# Patient Record
Sex: MALE | Race: WHITE | NOT HISPANIC OR LATINO | URBAN - METROPOLITAN AREA
[De-identification: names, ages, dates, MRNs, and addresses within clinical notes are randomized per-mention and may not be internally consistent; named-entity substitution may affect disease eponyms.]

---

## 2017-01-06 ENCOUNTER — OFFICE VISIT - HEALTHEAST (OUTPATIENT)
Dept: OTOLARYNGOLOGY | Facility: CLINIC | Age: 77
End: 2017-01-06

## 2017-01-06 DIAGNOSIS — H90.3 ASNHL (ASYMMETRICAL SENSORINEURAL HEARING LOSS): ICD-10-CM

## 2017-01-06 ASSESSMENT — MIFFLIN-ST. JEOR: SCORE: 1647.05

## 2017-11-01 ENCOUNTER — AMBULATORY - HEALTHEAST (OUTPATIENT)
Dept: NURSING | Facility: CLINIC | Age: 77
End: 2017-11-01

## 2017-11-01 DIAGNOSIS — Z23 NEED FOR VACCINATION: ICD-10-CM

## 2017-12-29 ENCOUNTER — COMMUNICATION - HEALTHEAST (OUTPATIENT)
Dept: FAMILY MEDICINE | Facility: CLINIC | Age: 77
End: 2017-12-29

## 2018-06-22 ENCOUNTER — COMMUNICATION - HEALTHEAST (OUTPATIENT)
Dept: FAMILY MEDICINE | Facility: CLINIC | Age: 78
End: 2018-06-22

## 2018-06-22 DIAGNOSIS — M54.9 BACK PAIN: ICD-10-CM

## 2018-07-09 ENCOUNTER — HOSPITAL ENCOUNTER (OUTPATIENT)
Dept: PHYSICAL MEDICINE AND REHAB | Facility: CLINIC | Age: 78
Discharge: HOME | End: 2018-07-09
Attending: PHYSICAL MEDICINE & REHABILITATION

## 2018-07-09 DIAGNOSIS — M54.50 LUMBAR SPINE PAIN: ICD-10-CM

## 2018-07-09 DIAGNOSIS — M41.9 SCOLIOSIS: ICD-10-CM

## 2018-07-09 DIAGNOSIS — R29.2 ABSENT LEFT PATELLAR REFLEX: ICD-10-CM

## 2018-07-09 ASSESSMENT — MIFFLIN-ST. JEOR: SCORE: 1701.48

## 2018-07-23 ENCOUNTER — OFFICE VISIT - HEALTHEAST (OUTPATIENT)
Dept: PHYSICAL THERAPY | Facility: REHABILITATION | Age: 78
End: 2018-07-23

## 2018-07-23 DIAGNOSIS — H90.3 ASNHL (ASYMMETRICAL SENSORINEURAL HEARING LOSS): ICD-10-CM

## 2018-07-23 DIAGNOSIS — M54.50 CHRONIC MIDLINE LOW BACK PAIN WITHOUT SCIATICA: ICD-10-CM

## 2018-07-23 DIAGNOSIS — R29.3 POOR POSTURE: ICD-10-CM

## 2018-07-23 DIAGNOSIS — G89.29 CHRONIC MIDLINE LOW BACK PAIN WITHOUT SCIATICA: ICD-10-CM

## 2018-07-23 DIAGNOSIS — M62.81 MUSCLE WEAKNESS (GENERALIZED): ICD-10-CM

## 2018-07-26 ENCOUNTER — OFFICE VISIT - HEALTHEAST (OUTPATIENT)
Dept: PHYSICAL THERAPY | Facility: REHABILITATION | Age: 78
End: 2018-07-26

## 2018-07-26 DIAGNOSIS — R29.3 POOR POSTURE: ICD-10-CM

## 2018-07-26 DIAGNOSIS — G89.29 CHRONIC MIDLINE LOW BACK PAIN WITHOUT SCIATICA: ICD-10-CM

## 2018-07-26 DIAGNOSIS — M62.81 MUSCLE WEAKNESS (GENERALIZED): ICD-10-CM

## 2018-07-26 DIAGNOSIS — M54.50 CHRONIC MIDLINE LOW BACK PAIN WITHOUT SCIATICA: ICD-10-CM

## 2018-07-31 ENCOUNTER — OFFICE VISIT - HEALTHEAST (OUTPATIENT)
Dept: PHYSICAL THERAPY | Facility: REHABILITATION | Age: 78
End: 2018-07-31

## 2018-07-31 DIAGNOSIS — R29.3 POOR POSTURE: ICD-10-CM

## 2018-07-31 DIAGNOSIS — G89.29 CHRONIC MIDLINE LOW BACK PAIN WITHOUT SCIATICA: ICD-10-CM

## 2018-07-31 DIAGNOSIS — M62.81 MUSCLE WEAKNESS (GENERALIZED): ICD-10-CM

## 2018-07-31 DIAGNOSIS — M54.50 CHRONIC MIDLINE LOW BACK PAIN WITHOUT SCIATICA: ICD-10-CM

## 2018-08-06 ENCOUNTER — OFFICE VISIT - HEALTHEAST (OUTPATIENT)
Dept: PHYSICAL THERAPY | Facility: REHABILITATION | Age: 78
End: 2018-08-06

## 2018-08-06 DIAGNOSIS — M62.81 MUSCLE WEAKNESS (GENERALIZED): ICD-10-CM

## 2018-08-06 DIAGNOSIS — R29.3 POOR POSTURE: ICD-10-CM

## 2018-08-06 DIAGNOSIS — M54.50 CHRONIC MIDLINE LOW BACK PAIN WITHOUT SCIATICA: ICD-10-CM

## 2018-08-06 DIAGNOSIS — G89.29 CHRONIC MIDLINE LOW BACK PAIN WITHOUT SCIATICA: ICD-10-CM

## 2018-08-20 ENCOUNTER — OFFICE VISIT - HEALTHEAST (OUTPATIENT)
Dept: PHYSICAL THERAPY | Facility: REHABILITATION | Age: 78
End: 2018-08-20

## 2018-08-20 DIAGNOSIS — M54.50 CHRONIC MIDLINE LOW BACK PAIN WITHOUT SCIATICA: ICD-10-CM

## 2018-08-20 DIAGNOSIS — R29.3 POOR POSTURE: ICD-10-CM

## 2018-08-20 DIAGNOSIS — M62.81 MUSCLE WEAKNESS (GENERALIZED): ICD-10-CM

## 2018-08-20 DIAGNOSIS — G89.29 CHRONIC MIDLINE LOW BACK PAIN WITHOUT SCIATICA: ICD-10-CM

## 2018-09-04 ENCOUNTER — OFFICE VISIT - HEALTHEAST (OUTPATIENT)
Dept: PHYSICAL THERAPY | Facility: REHABILITATION | Age: 78
End: 2018-09-04

## 2018-09-04 DIAGNOSIS — M62.81 MUSCLE WEAKNESS (GENERALIZED): ICD-10-CM

## 2018-09-04 DIAGNOSIS — G89.29 CHRONIC MIDLINE LOW BACK PAIN WITHOUT SCIATICA: ICD-10-CM

## 2018-09-04 DIAGNOSIS — R29.3 POOR POSTURE: ICD-10-CM

## 2018-09-04 DIAGNOSIS — M54.50 CHRONIC MIDLINE LOW BACK PAIN WITHOUT SCIATICA: ICD-10-CM

## 2019-04-25 ENCOUNTER — COMMUNICATION - HEALTHEAST (OUTPATIENT)
Dept: FAMILY MEDICINE | Facility: CLINIC | Age: 79
End: 2019-04-25

## 2019-05-10 ENCOUNTER — COMMUNICATION - HEALTHEAST (OUTPATIENT)
Dept: FAMILY MEDICINE | Facility: CLINIC | Age: 79
End: 2019-05-10

## 2019-05-10 ENCOUNTER — OFFICE VISIT - HEALTHEAST (OUTPATIENT)
Dept: FAMILY MEDICINE | Facility: CLINIC | Age: 79
End: 2019-05-10

## 2019-05-10 DIAGNOSIS — R01.1 HEART MURMUR: ICD-10-CM

## 2019-05-10 DIAGNOSIS — Z00.00 HEALTH CARE MAINTENANCE: ICD-10-CM

## 2019-05-10 LAB
ALBUMIN SERPL-MCNC: 4.1 G/DL (ref 3.5–5)
ALP SERPL-CCNC: 61 U/L (ref 45–120)
ALT SERPL W P-5'-P-CCNC: 14 U/L (ref 0–45)
ANION GAP SERPL CALCULATED.3IONS-SCNC: 8 MMOL/L (ref 5–18)
AST SERPL W P-5'-P-CCNC: 16 U/L (ref 0–40)
BILIRUB SERPL-MCNC: 0.7 MG/DL (ref 0–1)
BUN SERPL-MCNC: 15 MG/DL (ref 8–28)
CALCIUM SERPL-MCNC: 9.2 MG/DL (ref 8.5–10.5)
CHLORIDE BLD-SCNC: 105 MMOL/L (ref 98–107)
CHOLEST SERPL-MCNC: 179 MG/DL
CO2 SERPL-SCNC: 28 MMOL/L (ref 22–31)
CREAT SERPL-MCNC: 0.91 MG/DL (ref 0.7–1.3)
FASTING STATUS PATIENT QL REPORTED: YES
GFR SERPL CREATININE-BSD FRML MDRD: >60 ML/MIN/1.73M2
GLUCOSE BLD-MCNC: 96 MG/DL (ref 70–125)
HDLC SERPL-MCNC: 55 MG/DL
LDLC SERPL CALC-MCNC: 111 MG/DL
POTASSIUM BLD-SCNC: 4.1 MMOL/L (ref 3.5–5)
PROT SERPL-MCNC: 7.1 G/DL (ref 6–8)
SODIUM SERPL-SCNC: 141 MMOL/L (ref 136–145)
TRIGL SERPL-MCNC: 65 MG/DL

## 2019-05-10 ASSESSMENT — MIFFLIN-ST. JEOR: SCORE: 1660.44

## 2019-05-15 ENCOUNTER — COMMUNICATION - HEALTHEAST (OUTPATIENT)
Dept: FAMILY MEDICINE | Facility: CLINIC | Age: 79
End: 2019-05-15

## 2019-05-31 ENCOUNTER — HOSPITAL ENCOUNTER (OUTPATIENT)
Dept: CARDIOLOGY | Facility: HOSPITAL | Age: 79
Discharge: HOME | End: 2019-05-31
Attending: FAMILY MEDICINE

## 2019-05-31 DIAGNOSIS — R01.1 HEART MURMUR: ICD-10-CM

## 2019-05-31 ASSESSMENT — MIFFLIN-ST. JEOR: SCORE: 1659.53

## 2019-06-03 ENCOUNTER — COMMUNICATION - HEALTHEAST (OUTPATIENT)
Dept: FAMILY MEDICINE | Facility: CLINIC | Age: 79
End: 2019-06-03

## 2019-06-03 LAB
AORTIC ROOT: 3.7 CM
AORTIC VALVE MEAN VELOCITY: 103 CM/S
AV CUSP SEPERATION: 1.9 CM
AV CUSP SEPERATION: 1.9 CM
AV DIMENSIONLESS INDEX VTI: 0.6
AV MEAN GRADIENT: 4 MMHG
AV PEAK GRADIENT: 6.4 MMHG
AV VALVE AREA: 2.2 CM2
BSA FOR ECHO PROCEDURE: 2.15 M2
CV ECHO HEIGHT: 68.5 IN
CV ECHO WEIGHT: 211 LBS
DOP CALC AO PEAK VEL: 126 CM/S
DOP CALC AO VTI: 28.7 CM
DOP CALC LVOT AREA: 3.46 CM2
DOP CALC LVOT DIAMETER: 2.1 CM
DOP CALC LVOT STROKE VOLUME: 63 CM3
DOP CALC MV VTI: 43.5 CM
DOP CALCLVOT PEAK VEL VTI: 18.2 CM
EJECTION FRACTION: 58 % (ref 55–75)
FRACTIONAL SHORTENING: 29.7 % (ref 28–44)
INTERVENTRICULAR SEPTUM IN END DIASTOLE: 1.1 CM (ref 0.6–1)
IVS/PW RATIO: 1.1
LA AREA 1: 14.3 CM2
LA AREA 2: 16.7 CM2
LEFT ATRIUM LENGTH: 4.5 CM
LEFT ATRIUM SIZE: 3.7 CM
LEFT ATRIUM VOLUME INDEX: 21 ML/M2
LEFT ATRIUM VOLUME: 45.1 ML
LEFT VENTRICLE CARDIAC INDEX: 2.1 L/MIN/M2
LEFT VENTRICLE CARDIAC OUTPUT: 4.6 L/MIN
LEFT VENTRICLE DIASTOLIC VOLUME INDEX: 32.1 CM3/M2 (ref 34–74)
LEFT VENTRICLE DIASTOLIC VOLUME: 69 CM3 (ref 62–150)
LEFT VENTRICLE HEART RATE: 73 BPM
LEFT VENTRICLE MASS INDEX: 56.2 G/M2
LEFT VENTRICLE SYSTOLIC VOLUME INDEX: 13.5 CM3/M2 (ref 11–31)
LEFT VENTRICLE SYSTOLIC VOLUME: 29 CM3 (ref 21–61)
LEFT VENTRICULAR INTERNAL DIMENSION IN DIASTOLE: 3.7 CM (ref 4.2–5.8)
LEFT VENTRICULAR INTERNAL DIMENSION IN SYSTOLE: 2.6 CM (ref 2.5–4)
LEFT VENTRICULAR MASS: 120.8 G
LEFT VENTRICULAR OUTFLOW TRACT MEAN GRADIENT: 2 MMHG
LEFT VENTRICULAR OUTFLOW TRACT MEAN VELOCITY: 70.1 CM/S
LEFT VENTRICULAR POSTERIOR WALL IN END DIASTOLE: 1 CM (ref 0.6–1)
LV STROKE VOLUME INDEX: 29.3 ML/M2
MITRAL VALVE DECELERATION SLOPE: 2940 MM/S2
MITRAL VALVE E/A RATIO: 0.7
MITRAL VALVE MEAN INFLOW VELOCITY: 71.5 CM/S
MITRAL VALVE PEAK VELOCITY: 119 CM/S
MITRAL VALVE PRESSURE HALF-TIME: 111 MS
MV AREA VTI: 1.45 CM2
MV AVERAGE E/E' RATIO: 9.8 CM/S
MV DECELERATION TIME: 264 MS
MV E'TISSUE VEL-LAT: 7.6 CM/S
MV E'TISSUE VEL-MED: 8.19 CM/S
MV LATERAL E/E' RATIO: 10.2
MV MEAN GRADIENT: 2 MMHG
MV MEDIAL E/E' RATIO: 9.5
MV PEAK A VELOCITY: 109 CM/S
MV PEAK E VELOCITY: 77.5 CM/S
MV PEAK GRADIENT: 5.7 MMHG
MV VALVE AREA BY CONTINUITY EQUATION: 1.4 CM2
MV VALVE AREA PRESSURE 1/2 METHOD: 2 CM2
NUC REST DIASTOLIC VOLUME INDEX: 3376 LBS
NUC REST SYSTOLIC VOLUME INDEX: 68.5 IN
TRICUSPID REGURGITATION PEAK PRESSURE GRADIENT: 25.8 MMHG
TRICUSPID VALVE ANULAR PLANE SYSTOLIC EXCURSION: 2.4 CM
TRICUSPID VALVE PEAK REGURGITANT VELOCITY: 254 CM/S

## 2019-11-13 ENCOUNTER — AMBULATORY - HEALTHEAST (OUTPATIENT)
Dept: FAMILY MEDICINE | Facility: CLINIC | Age: 79
End: 2019-11-13

## 2019-11-13 DIAGNOSIS — Z00.00 HEALTH CARE MAINTENANCE: ICD-10-CM

## 2020-06-11 ENCOUNTER — OFFICE VISIT - HEALTHEAST (OUTPATIENT)
Dept: FAMILY MEDICINE | Facility: CLINIC | Age: 80
End: 2020-06-11

## 2020-06-11 DIAGNOSIS — Z71.1 CONCERN ABOUT EAR DISEASE WITHOUT DIAGNOSIS: ICD-10-CM

## 2020-10-27 ENCOUNTER — COMMUNICATION - HEALTHEAST (OUTPATIENT)
Dept: FAMILY MEDICINE | Facility: CLINIC | Age: 80
End: 2020-10-27

## 2020-12-03 ENCOUNTER — OFFICE VISIT - HEALTHEAST (OUTPATIENT)
Dept: FAMILY MEDICINE | Facility: CLINIC | Age: 80
End: 2020-12-03

## 2020-12-03 DIAGNOSIS — H93.91 LESION OF RIGHT EAR: ICD-10-CM

## 2020-12-03 DIAGNOSIS — Z23 NEED FOR 23-POLYVALENT PNEUMOCOCCAL POLYSACCHARIDE VACCINE: ICD-10-CM

## 2020-12-03 DIAGNOSIS — Z23 NEED FOR TETANUS BOOSTER: ICD-10-CM

## 2020-12-03 DIAGNOSIS — R06.2 WHEEZING: ICD-10-CM

## 2020-12-03 RX ORDER — ALBUTEROL SULFATE 90 UG/1
2 AEROSOL, METERED RESPIRATORY (INHALATION) EVERY 4 HOURS PRN
Qty: 1 EACH | Refills: 0 | Status: SHIPPED | OUTPATIENT
Start: 2020-12-03

## 2020-12-03 ASSESSMENT — MIFFLIN-ST. JEOR: SCORE: 1676.2

## 2021-02-10 ENCOUNTER — AMBULATORY - HEALTHEAST (OUTPATIENT)
Dept: NURSING | Facility: CLINIC | Age: 81
End: 2021-02-10

## 2021-03-03 ENCOUNTER — AMBULATORY - HEALTHEAST (OUTPATIENT)
Dept: NURSING | Facility: CLINIC | Age: 81
End: 2021-03-03

## 2021-05-28 NOTE — PATIENT INSTRUCTIONS - HE
Need the immunization records from [place], [place]    Fasting labs    Schedule Cardiac ECHO  Old notes were reviewed and no previous murmur had been noted.   I discussed the murmur with the pt and the rationale for getting an ECHO at this time.

## 2021-05-28 NOTE — PROGRESS NOTES
Assessment:      Annual Wellness Visit    Encounter Diagnoses   Name Primary?     Health care maintenance Yes     Heart murmur, NEWLY NOTED.                Patient Instructions   Need the immunization records from [place], [place]    Fasting labs    Schedule Cardiac ECHO  Old notes were reviewed and no previous murmur had been noted.   I discussed the murmur with the pt and the rationale for getting an ECHO at this time.            Plan:      Patient Instructions   Need the immunization records from [place], [place]    Fasting labs    Schedule Cardiac ECHO  Old notes were reviewed and no previous murmur had been noted.   I discussed the murmur with the pt and the rationale for getting an ECHO at this time.           Subjective:      Dhaval Veliz is a 78 y.o. male who presents for a Subsequent Annual Wellness Visit.    Last colonoscopy was 6 years ago.  No shortness of breath  or chest pain.  No prior known heart murmur.    Healthy Habits:   Regular Exercise: Yes  Sunscreen Use: No  Healthy Diet: Yes  Dental Visits Regularly: Yes and once a year  Seat Belt: Yes  Sexually active: No  Monthly Self Testicular Exams:  N/A  Hemoccults: N/A  Flex Sig: N/A  Colonoscopy: Yes and last colonoscopy was 6 years ago and he was told it was the last one and pt does not want another one.   Lipid Profile: Yes  Glucose Screen: Yes  Prevention of Osteoporosis: N/A  Last Dexa: N/A  Guns at Home:  No      Immunization History   Administered Date(s) Administered     Influenza high dose, seasonal 10/14/2016, 11/01/2017     Influenza, Seasonal, Inj PF IIV3 10/14/2015     Immunization status: unknown status, parent to bring shot records.    Current Outpatient Medications   Medication Sig Dispense Refill     aspirin 325 MG tablet Take 325 mg by mouth every other day.       No current facility-administered medications for this visit.      Past Medical History:   Diagnosis Date     Vertigo      Past Surgical History:   Procedure Laterality Date      APPENDECTOMY       INGUINAL HERNIA REPAIR Left      LIPOMA RESECTION       Patient has no known allergies.  Family History   Problem Relation Age of Onset     Heart disease Mother      Lung cancer Mother      Diabetes type II Paternal Grandmother      Stroke Paternal Grandfather      Social History     Socioeconomic History     Marital status:      Spouse name: Not on file     Number of children: Not on file     Years of education: Not on file     Highest education level: Not on file   Occupational History     Not on file   Social Needs     Financial resource strain: Not on file     Food insecurity:     Worry: Not on file     Inability: Not on file     Transportation needs:     Medical: Not on file     Non-medical: Not on file   Tobacco Use     Smoking status: Never Smoker     Smokeless tobacco: Never Used   Substance and Sexual Activity     Alcohol use: No     Drug use: Not on file     Sexual activity: Not on file   Lifestyle     Physical activity:     Days per week: Not on file     Minutes per session: Not on file     Stress: Not on file   Relationships     Social connections:     Talks on phone: Not on file     Gets together: Not on file     Attends Bahai service: Not on file     Active member of club or organization: Not on file     Attends meetings of clubs or organizations: Not on file     Relationship status: Not on file     Intimate partner violence:     Fear of current or ex partner: Not on file     Emotionally abused: Not on file     Physically abused: Not on file     Forced sexual activity: Not on file   Other Topics Concern     Not on file   Social History Narrative     Not on file       Current Diet:  well balanced diet  Amount Consumed Per Day  na    Exercise Type: walking  Exercise Frequency: 5-6 cays a week    Mood Disorder and Cognitive Impairment Screenings  Anxiety Screening Tool:  na       Anxiety Screening Tool Score:  na  Depression Screening Tool:  PHQ-2    Depression Screening Tool  "Score:  0  Cognitive Impairment and Additional Screening:  No difficulty.  MINI-COG  5/5    Functional Ability/Level of Safety  Fall Risk Factors:  previous fall and FELL OFF A LADDER AND HIT HIS BACK ONE WEEK AGO  Home Safety Risk Factors: NONE    Advanced Directive:  The patient has a living will.    Co-Managers and Medical Equipment/Suppliers:  Dr Shields, FP    Review of Systems  Review of Systems (12 SYSTEMS)  Has a decrease in hearing and has hearing aides.  backpain  Heartburn occasionally if eats the wrong type of food  Urinating more than once a night.           Objective:     Vitals:    05/10/19 1038 05/10/19 1119   BP: 144/59 137/66   Pulse: 63    Resp: 20    Temp: 97.3  F (36.3  C)    TempSrc: Oral    Weight: 211 lb 3.2 oz (95.8 kg)    Height: 5' 8.5\" (1.74 m)             GEN:  ALERT, ORIENTED TIMES THREE, NO APPARENT DISTRESS  HEENT:  TM'S NL                  PERRL                  THROAT CLEAR  NECK: SUPPLE WITHOUT ADENOPATHY, THYROMEGALY OR CAROTID BRUIT  LUNGS:  CTA  COR:  RRR WITH GRADE 2/6 ALBER RIGHT 2ND ICS  ABDOMEN: SOFT, POSITIVE BOWEL SOUNDS, NONTX WITHOUT MASS  EXT: NO CYANOSIS, CLUBBING OR EDEMA  SKIN:  NO ATYPICAL APPEARING SKIN LESIONS              "

## 2021-05-30 VITALS — BODY MASS INDEX: 29.06 KG/M2 | HEIGHT: 70 IN | WEIGHT: 203 LBS

## 2021-06-01 ENCOUNTER — OFFICE VISIT - HEALTHEAST (OUTPATIENT)
Dept: FAMILY MEDICINE | Facility: CLINIC | Age: 81
End: 2021-06-01

## 2021-06-01 VITALS — HEIGHT: 70 IN | WEIGHT: 215 LBS | BODY MASS INDEX: 30.78 KG/M2

## 2021-06-01 DIAGNOSIS — M25.562 ACUTE PAIN OF LEFT KNEE: ICD-10-CM

## 2021-06-02 VITALS — BODY MASS INDEX: 31.25 KG/M2 | WEIGHT: 211 LBS | HEIGHT: 69 IN

## 2021-06-02 VITALS — BODY MASS INDEX: 31.28 KG/M2 | HEIGHT: 69 IN | WEIGHT: 211.2 LBS

## 2021-06-05 VITALS
HEART RATE: 64 BPM | BODY MASS INDEX: 31.67 KG/M2 | SYSTOLIC BLOOD PRESSURE: 128 MMHG | RESPIRATION RATE: 16 BRPM | WEIGHT: 213.8 LBS | HEIGHT: 69 IN | DIASTOLIC BLOOD PRESSURE: 63 MMHG

## 2021-06-08 NOTE — PROGRESS NOTES
"Dhaval Veliz is a 76 y.o. male seen in consultation at the request of Dr. Shields for hearing loss.  Patient has noticed gradual hearing loss over over 10 years.  Denies otologic history of infections or surgeries. Notes \"inner ear vertigo\" 10 years ago.  He passed out a couple of times through this, also spinning.  Had thorough evaluation and saw Neurologist.  Notes bilateral non-pulsatile tinnitus.  Now gets dizzy only momentarily when moving out from underneath the car changing the oil.  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    ALLERGY:  No Known Allergies    MEDICATIONS:     Current Outpatient Prescriptions on File Prior to Visit   Medication Sig Dispense Refill     aspirin 325 MG tablet Take 325 mg by mouth every other day.       cholecalciferol, vitamin D3, 1,000 unit tablet Take 1,000 Units by mouth daily.       cyanocobalamin (VITAMIN B-12) 1000 MCG tablet Take 1,000 mcg by mouth daily.       glucosamine-chondroit-vit C-Mn (GLUCOSAMINE 1500 COMPLEX) 500-400 mg cap Take 1 capsule by mouth daily.       MULTIVITAMIN ORAL Take 1 tablet by mouth daily.       potassium gluconate 595 mg (99 mg) Tab Take 1 tablet by mouth daily.       THIAMINE HCL (VITAMIN B-1 ORAL) Take 100 mg by mouth daily.       vitamin A 8000 UNIT capsule Take 8,000 Units by mouth daily.       No current facility-administered medications on file prior to visit.        Past Medical/Surgical History, Family History and Social History reviewed in detail and documented separately in the medical record.    Complete Review of Systems:  A 10-point review was performed.  Pertinent positives are noted in the HPI and on a separate scanned document in the chart.    EXAM:  There were no vitals filed for this visit.    Nurse documentation reviewed  and documented separately.    General Appearance: Pleasant, alert, appropriate appearance for age. No acute distress    Head Exam: Normal. Normocephalic, atraumatic.    Eye Exam: Normal external eye, " conjunctiva, lids, cornea. Extra-ocular movements are intact.    Left external ear: normal  Left otoscopic exam: Normal EAC. Normal TM     Right external ear: normal  Right otoscopic exam: Normal EAC. Normal TM    Nose Exam: Normal external nose. Septum midline. Nasal mucosa normal.  Inferior turbinates normal.    OroPharynx Exam: Dental hygiene adequate. Normal tongue. Normal buccal mucosa. Normal palate.  Normal pharynx. Normal tonsils.    Neck Exam: Supple, no masses or nodes. Trachea and larynx midline.    Thyroid Exam: No tenderness, nodules or enlargement.    Salivary Glands: nontender without masses    Neuro: Alert and oriented times 3, CN 2-12 grossly intact, no nystagmus, PERRL, EOMI, normal speech and gait    Chest/Respiratory Exam: Normal chest wall motion and respiratory effort. No audible stridor or wheezing.    Cardiovascular Exam: Regular rate and rhythm.  No cyanosis, clubbing or edema.    Pulses: carotid pulses normal    ASSESSMENT:  1. ASNHL (asymmetrical sensorineural hearing loss)        PLAN: Findings, assessment, and management options were discussed.   Would like to get MRI to rule out retrocochlear process, however, Dhaval would like to observe for now and he will come in sooner if he notices significant change in his hearing.  Otherwise cleared for hearing aids.  Follow up annual hearing test.

## 2021-06-08 NOTE — PROGRESS NOTES
"Dhaval Veliz is a 79 y.o. male who is being evaluated via a billable video visit.      The patient has been notified of following:     \"This video visit will be conducted via a call between you and your physician/provider. We have found that certain health care needs can be provided without the need for an in-person physical exam.  This service lets us provide the care you need with a video conversation.  If a prescription is necessary we can send it directly to your pharmacy.  If lab work is needed we can place an order for that and you can then stop by our lab to have the test done at a later time.    Video visits are billed at different rates depending on your insurance coverage. Please reach out to your insurance provider with any questions.    If during the course of the call the physician/provider feels a video visit is not appropriate, you will not be charged for this service.\"    Patient has given verbal consent to a Video visit? Yes    Will anyone else be joining your video visit? No    Send text message to patient: [phone]        Video Start Time: 8:03 AM    Additional provider notes: GENERAL: Healthy, alert and no distress  EYES: Eyes grossly normal to inspection. No discharge or erythema, or obvious scleral/conjunctival abnormalities.  RESP: No audible wheeze, cough, or visible cyanosis.  No visible retractions or increased work of breathing.    NEURO: Cranial nerves grossly intact. Mentation and speech appropriate for age.  PSYCH: Mentation appears normal, affect normal/bright, judgement and insight intact, normal speech and appearance well-groomed      Video-Visit Details   HPI:  \"MY HEARING IS REALLY SHOT\".   Told at the hearing aid center that he had an ear infection.  No pain in the left ear. No pain with movement of the left external ear.  The hearing aide doesn't go to the depth where the redness apparently is.     Encounter Diagnosis   Name Primary?     Concern about ear disease " without diagnosis Yes      PLAN:   Make an appt if having ANY ear pain    Also have the ears looked at next time you are in clinic.    Make an appt at any time you wish to have us check out the ear.       Type of service:  Video Visit    Video End Time (time video stopped): 8:16 AM  Originating Location (pt. Location): Home    Distant Location (provider location):  [facility]     Platform used for Video Visit: Par[employer]      Tha Shields MD

## 2021-06-08 NOTE — PATIENT INSTRUCTIONS - HE
Make an appt if having ANY ear pain    Also have the ears looked at next time you are in clinic.    Make an appt at any time you wish to have us check out the ear.

## 2021-06-13 NOTE — PATIENT INSTRUCTIONS - HE
Tdap    Pneumovax 23    Check with insurance on Shingrix coverage    Referral to dermatology    Albuterol refill for prn use (on very rare occasions)

## 2021-06-13 NOTE — PROGRESS NOTES
"DIAGNOSIS:  1. Lesion of right ear, chronic recurring lesion of the ear,  Needs to see derm Ambulatory referral to Dermatology   2. Wheezing, rare and associated with pollen exposure, refill albuterol (PROAIR HFA;PROVENTIL HFA;VENTOLIN HFA) 90 mcg/actuation inhaler   3. Need for tetanus booster Tdap vaccine,  8yo or older,  IM   4. Need for 23-polyvalent pneumococcal polysaccharide vaccine  Pneumococcal polysaccharide vaccine 23-valent 3 yo or older, subq/IM       PLAN:     Tdap    Pneumovax 23    Check with insurance on Shingrix coverage    Referral to dermatology    Albuterol refill for prn use (on very rare occasions)            HPI:  Has had a problem for about 3 years with the top of his right ear.  Drains and scabs frequently.    occas gets a wheezing reaction to pollen for which he will use albuterol.            Current Outpatient Medications on File Prior to Visit   Medication Sig Dispense Refill     aspirin 81 MG EC tablet Take 1 tablet (81 mg total) by mouth every other day. 81 MG PO DAILY 100 tablet 3     No current facility-administered medications on file prior to visit.        Pmh: reviewed  Psh: reviewed  Allergy:  reviewed      EXAM:    /63   Pulse 64   Resp 16   Ht 5' 8.75\" (1.746 m)   Wt 213 lb 12.8 oz (97 kg)   BMI 31.80 kg/m    GEN:   ALERT, NAD, ORIENTED TIMES THREE  NECK: SUPPLE WITHOUT ADENOPATHY OR THYROMEGALY  LUNGS: CTA  COR: RRR WITHOUT MURMUR  SKIN: top of right ear with erythema and a few small pearlescent plaques.            6 mm papule flesh colored papule on the right ear.  EXT: WITHOUT EDEMA/SWELLING    No results found for this or any previous visit (from the past 168 hour(s)).       "

## 2021-06-15 PROBLEM — H90.3 ASNHL (ASYMMETRICAL SENSORINEURAL HEARING LOSS): Status: ACTIVE | Noted: 2017-01-06

## 2021-06-19 NOTE — PROGRESS NOTES
[facility] Daily Progress     Patient Name: Dahval Veliz   Preferred Name: Alvarado  Date: 2018  Visit #: 2  PTA visit #:  NA  Referral Diagnosis: Lumbar spine pain  Referring provider: Adi Cano DO  Visit Diagnosis:     ICD-10-CM    1. Chronic midline low back pain without sciatica M54.5     G89.29    2. Muscle weakness (generalized) M62.81    3. Poor posture R29.3      Dhaval Veliz is a 78 y.o. male who presents to therapy today with chief complaints of chronic episodic low back pain. Onset date of sx was  after a severe muscle pull in his lower back.  Pt reported h/o scoliosis.  Pain symptoms are stabbing and twisting in nature when it comes.  Functional impairments include lifting heavy objects, woodworking, and quick movements.  Pt demo's signs and sx consistent with chronic LBP with acute pain episodes.     Precautions / Restrictions : None    Assessment:     HEP/POC compliance is  good .  The patient demonstrates improvements in his pain symptoms since last session.  He was able to tolerate treatment well and has a good understanding of his HEP.  He is appropriate to continue with PT services at this time.    Goal Status:  Pt. will be independent with home exercise program in : 6 weeks  Pt will: be able to perform normal exercise without pain; in 8 weeks  Pt will: be able to perform lifting activities without pain; in 8 weeks  Pt will: be able to walk upright without pain; in 8 weeks    Plan / Patient Education:     Continue with initial plan of care.  Progress with home program as tolerated.  Continue with S/L mobs if helpful.  Progress HEP as able.    Subjective:     Pain Ratin  The patient reports that he has been doing his exercises daily.  He is finding that he is able to do things with less pain than he used to get.  The pain continues to be on the L compared to the R.    Objective:     Pain with lumbar extension, mod/major movement loss with side bending and  rotation.  Mild pain with bilateral rotation lumbar spine.    Exercise #1: Cat/cow  Comment #1: HEP  Exercise #2: Ab sets/marching  Comment #2: x 10; progressed to LE extension x 10  Exercise #3: LTR  Comment #3: x 5 bilaterally  Exercise #4: KTC stretch  Comment #4: 30 seconds bilaterally  Exercise #5: Multifidus strengthening  Comment #5: L3 HEP    Appt time: 10:30AM - 10:55AM    Treatment Today     TREATMENT MINUTES COMMENTS   Evaluation     Self-care/ Home management     Manual therapy 10 R S/L L lumbar rotation mobs grades I-II   Neuromuscular Re-education     Therapeutic Activity     Therapeutic Exercises 15 NUSTEP x 5 minutes WL 5.0; subjective measures taken  See flowsheet   Gait training     Modality__________________                Total 25    Blank areas are intentional and mean the treatment did not include these items.       Juliet Almanza, PT, DPT  7/26/2018

## 2021-06-19 NOTE — LETTER
Letter by Tha Shields MD at      Author: Tha Shields MD Service: -- Author Type: --    Filed:  Encounter Date: 5/10/2019 Status: (Other)         Dhaval Veliz  [address]  [facility] [address]             May 10, 2019         Dear Mr. Veliz,    Below are the results from your recent visit:    Resulted Orders   Lipid [place] FASTING   Result Value Ref Range    Cholesterol 179 <=199 mg/dL    Triglycerides 65 <=149 mg/dL    HDL Cholesterol 55 >=40 mg/dL    LDL Calculated 111 <=129 mg/dL    Patient Fasting > 8hrs? Yes    Comprehensive Metabolic Panel   Result Value Ref Range    Sodium 141 136 - 145 mmol/L    Potassium 4.1 3.5 - 5.0 mmol/L    Chloride 105 98 - 107 mmol/L    CO2 28 22 - 31 mmol/L    Anion Gap, Calculation 8 5 - 18 mmol/L    Glucose 96 70 - 125 mg/dL    BUN 15 8 - 28 mg/dL    Creatinine 0.91 0.70 - 1.30 mg/dL    GFR MDRD Af Amer >60 >60 mL/min/1.73m2    GFR MDRD Non Af Amer >60 >60 mL/min/1.73m2    Bilirubin, Total 0.7 0.0 - 1.0 mg/dL    Calcium 9.2 8.5 - 10.5 mg/dL    Protein, Total 7.1 6.0 - 8.0 g/dL    Albumin 4.1 3.5 - 5.0 g/dL    Alkaline Phosphatase 61 45 - 120 U/L    AST 16 0 - 40 U/L    ALT 14 0 - 45 U/L    Narrative    Fasting Glucose reference range is 70-99 mg/dL per  American Diabetes Association (ADA) guidelines.       Hi Rich:  Your cholesterol panel looks good.  The remaining labs are normal.  It was a pleasure seeing you today in clinic.    Please call with questions or contact us using [employer].    Sincerely,        Electronically signed by Tha Shields MD

## 2021-06-19 NOTE — PROGRESS NOTES
[facility]   [facility]-Pelvic Initial Evaluation    Patient Name: Dhaval Veliz   Preferred Name: Alvarado  Date of evaluation: 7/23/2018  Referral Diagnosis: Lumbar spine pain  Referring provider: Adi Cano DO  Visit Diagnosis:     ICD-10-CM    1. Chronic midline low back pain without sciatica M54.5     G89.29    2. Muscle weakness (generalized) M62.81    3. Poor posture R29.3        Assessment:        Dhaval Veliz is a 78 y.o. male who presents to therapy today with chief complaints of chronic episodic low back pain. Onset date of sx was 1971 after a severe muscle pull in his lower back.  Pt reported h/o scoliosis.  Pain symptoms are stabbing and twisting in nature when it comes.  Functional impairments include lifting heavy objects, woodworking, and quick movements.  Pt demo's signs and sx consistent with chronic LBP with acute pain episodes.   Pt. is appropriate for skilled PT intervention as outlined in the Plan of Care (POC).  Pt. is a good candidate for skilled PT services to improve pain levels and function.    Goals:  Pt. will be independent with home exercise program in : 6 weeks  Pt will: be able to perform normal exercise without pain; in 8 weeks  Pt will: be able to perform lifting activities without pain; in 8 weeks  Pt will: be able to walk upright without pain; in 8 weeks    Patient's expectations/goals are realistic.    Barriers to Learning or Achieving Goals:  No Barriers.       Plan / Patient Instructions:        Plan of Care:   Communication with: Referral Source  Patient Related Instruction: Nature of Condition;Treatment plan and rationale;Self Care instruction;Basis of treatment;Body mechanics;Posture  Times per Week: 1-2  Number of Weeks: 6-12  Number of Visits: 6-12  Precautions / Restrictions : None  Therapeutic Exercise: ROM;Stretching;Strengthening  Neuromuscular Reeducation: kinesio tape;posture;core  Manual Therapy: soft tissue mobilization;myofascial  release;joint mobilization;muscle energy  Modalities: electrical stimulation;ultrasound    POC and pathology of condition were reviewed with patient.  Pt. is in agreement with the Plan of Care  A Home Exercise Program (HEP) was initiated today.  Pt. was instructed in exercises by PT and patient was given a handout with detailed instructions.    Plan for next visit: Lumbar rotation mobs.  Progress core strengthening.     Subjective:       The patient reports that when he was young he was lifting 100# bags of salt and ended up straining his back.  He found out that he pulled a muscle pretty badly and his pain would go away for years and then come back.  About 3 months ago, his pain started up again and then he would start sleeping in a recliner.  He was sleeping really well in a recliner.  He has now purchased an adjustable bed which works well.      Social information:   Living Situation:single family home   Occupation:retired   Work Status:NA   Equipment Available: None    Pain Ratin  Pain rating at best: 0  Pain rating at worst: 10  Pain description: stabbing and twisting pain    Functional limitations are described as occurring with:   lifting heavy objects, woodworking, quick movements    Patient reports benefit from:  rest  , anti-inflammatory       Objective:      Note: Items left blank indicates the item was not performed or not indicated at the time of the evaluation.    Patient Outcome Measures :    Modified Oswestry Low Back Pain Disablity Questionnaire  in %: 12   Scores range from 0-100%, where a score of 0% represents minimal pain and maximal function. The minimal clinically important difference is a score reduction of 12%.    Balance Assessment: 11 STS in 30 seconds    Examination  1. Chronic midline low back pain without sciatica     2. Muscle weakness (generalized)     3. Poor posture       Involved side: Bilateral and centrally  Posture Observation:      General standing posture is  poor.  Lumbopelvic complex: Moderate scoliosis  Severely decreased lumbar lordosis    Lumbar ROM:    Date: 07/23/18     *Indicate scale AROM AROM AROM   Lumbar Flexion Mod limited     Lumbar Extension Major limited and painful      Right Left Right Left Right Left   Lumbar Sidebending Major limited Major limited       Lumbar Rotation Major limited Major limited       Thoracic Flexion WNL     Thoracic Extension Major limited     Thoracic Sidebending Pain L Pain L       Thoracic Rotation Major limited Major limited         Lower Extremity Strength:     Date: 07/23/18     LE strength/5 Right Left Right Left Right Left   Hip Flexion (L1-3) 5 5       Hip Extension (L5-S1)         Hip Abduction (L4-5)         Hip Adduction (L2-3)         Hip External Rotation         Hip Internal Rotation         Knee Extension (L3-4) 5 5       Knee Flexion 5 5       Ankle Dorsiflexion (L4-5) 5 5       Great Toe Extension (L5)         Ankle Plantar flexion (S1)         Abdominals        Sensation            Reflex Testing  Lumbar Dermatomes Right Left UE Reflexes Right Left   Iliac Crest and Groin (L1)   Biceps (C5-6)     Anterior Medial Thigh (L2)   Brachioradialis (C5-6)     Anterior Thigh, Medial Epicondyle Femur (L3)   Triceps (C7-8)     Lateral Thigh, Anterior Knee, Medial Leg/Malleolus (L4)   Catie s test     Lateral Leg, Dorsal Foot (L5)   LE Reflexes     Lateral Foot (S1)   Patellar (L3-4)     Posterior Leg (S2)   Achilles (S1-2)     Other:   Babinski Response       Palpation: No palpatory tenderness noted.    Lumbar Special Tests:     Lumbar Special Tests Right Left SI Tests Right  Left   Quadrant test Pain Pain SI Compression     Straight leg raise   SI Distraction     Crossover response   POSH Test     Slump   Sacral Thrust     Sit-up test  FADIR     Trunk extensor endurance test  Resisted Abduction     Prone instability test  Other:     Pubic shotgun  Other:       Repeated Motion Testing:  Not indicated    Passive Mobility  - Joint Integrity:  Hypomobile  Pain L facet joint with hypomobility especially L3-L5.    LE Screen:  Mild pain with hip scour.  Decreased hip IR ROM bilaterally.    Appt time: 10:32AM - 11:26AM    Treatment Today     TREATMENT MINUTES COMMENTS   Evaluation 30 Low complexity lumbar evaluation   Self-care/ Home management     Manual therapy     Neuromuscular Re-education     Therapeutic Activity     Therapeutic Exercises 24 Demo/performance of HEP  Patient educated on pathology  Discussed POC  Patient given STS exercise for home to help increase leg mm strength   Gait training     Modality__________________                Total 54    Blank areas are intentional and mean the treatment did not include these items.     PT Evaluation Code: (Please list factors)  Patient History/Comorbidities: ASNHL  Examination: Impaired lumbar ROM with hypomobility and pain L3-L5.  Clinical Presentation: Stable  Clinical Decision Making: Low complexity    Patient History/  Comorbidities Examination  (body structures and functions, activity limitations, and/or participation restrictions) Clinical Presentation Clinical Decision Making (Complexity)   No documented Comorbidities or personal factors 1-2 Elements Stable and/or uncomplicated Low   1-2 documented comorbidities or personal factor 3 Elements Evolving clinical presentation with changing characteristics Moderate   3-4 documented comorbidities or personal factors 4 or more Unstable and unpredictable High            Juliet Almanza, PT, DPT  7/23/2018  12:16 PM

## 2021-06-19 NOTE — PROGRESS NOTES
[facility] Daily Progress     Patient Name: Dhaval Veliz   Preferred Name: Alvarado  Date: 2018  Visit #: 4  PTA visit #:  NA  Referral Diagnosis: Lumbar spine pain  Referring provider: Adi Cano DO  Visit Diagnosis:     ICD-10-CM    1. Chronic midline low back pain without sciatica M54.5     G89.29    2. Muscle weakness (generalized) M62.81    3. Poor posture R29.3      Dhaval Veliz is a 78 y.o. male who presents to therapy today with chief complaints of chronic episodic low back pain. Onset date of sx was  after a severe muscle pull in his lower back.  Pt reported h/o scoliosis.  Pain symptoms are stabbing and twisting in nature when it comes.  Functional impairments include lifting heavy objects, woodworking, and quick movements.  Pt demo's signs and sx consistent with chronic LBP with acute pain episodes.     Precautions / Restrictions : None    Assessment:     HEP/POC compliance is  good .  The patient reports significant improvements in his rib and low back pain.  He is progressing well toward goals and is appropriate to continue with PT services in 2 weeks.    Goal Status:  Pt. will be independent with home exercise program in : 6 weeks;Progressing toward  Pt will: be able to perform normal exercise without pain; in 8 weeks; progressing toward  Pt will: be able to perform lifting activities without pain; in 8 weeks; progressing toward  Pt will: be able to walk upright without pain; in 8 weeks; progressing toward    Plan / Patient Education:     Continue with initial plan of care.  Progress with home program as tolerated.  Progress HEP; work on exercises for long-term at the [facility].    Subjective:     Pain Ratin  The patient reports that the jabbing pain he was getting in his side is gone.  The exercises are going well but finds that the cat/cow exercise is very stiff.  He does get some soreness by the end of the day.    Objective:     Decreased lumbar extension  ROM.    Exercise #1: Cat/cow  Comment #1: HEP  Exercise #2: Ab sets/marching  Comment #2: LE extension HEP  Exercise #3: LTR  Comment #3: HEP  Exercise #4: KTC stretch  Comment #4: HEP  Exercise #5: Multifidus strengthening  Comment #5: L3 HEP  Exercise #6: Bridging  Comment #6: x 10; progressed to bridge march x 10  Exercise #7: Piriformis stretch  Comment #7: 30 seconds  Exercise #8: Hamstring stretch  Comment #8: 30 seconds  Exercise #9: Plank  Comment #9: On elbows x 10 seconds x 8 repetitions    Appt time: 10:32AM - 10:57AM    Treatment Today     TREATMENT MINUTES COMMENTS   Evaluation     Self-care/ Home management     Manual therapy     Neuromuscular Re-education     Therapeutic Activity     Therapeutic Exercises 25 NUSTEP x 5 minutes WL 5.0; subjective measures taken  See flowsheet   Gait training     Modality__________________                Total 25    Blank areas are intentional and mean the treatment did not include these items.       Juliet Almanza, PT, DPT  8/6/2018

## 2021-06-19 NOTE — PROGRESS NOTES
[facility] Daily Progress     Patient Name: Dhaval Veliz   Preferred Name: Alvarado  Date: 7/31/2018  Visit #: 3  PTA visit #:  NA  Referral Diagnosis: Lumbar spine pain  Referring provider: Adi Cano DO  Visit Diagnosis:     ICD-10-CM    1. Chronic midline low back pain without sciatica M54.5     G89.29    2. Muscle weakness (generalized) M62.81    3. Poor posture R29.3      Dhaval Veliz is a 78 y.o. male who presents to therapy today with chief complaints of chronic episodic low back pain. Onset date of sx was 1971 after a severe muscle pull in his lower back.  Pt reported h/o scoliosis.  Pain symptoms are stabbing and twisting in nature when it comes.  Functional impairments include lifting heavy objects, woodworking, and quick movements.  Pt demo's signs and sx consistent with chronic LBP with acute pain episodes.     Precautions / Restrictions : None    Assessment:     HEP/POC compliance is  good .  The patient demonstrates improvements in his pain symptoms since last session.  He was able to tolerate treatment well and has a good understanding of his HEP.  The patient likely is having pain in his side from a mm strain in his intercostal mm after a coughing fit he had on Thursday.  If this pain is no better at next session, it will be addressed mor thoroughly.  He is appropriate to continue with PT services at this time.    Goal Status:  Pt. will be independent with home exercise program in : 6 weeks;Progressing toward  Pt will: be able to perform normal exercise without pain; in 8 weeks; progressing toward  Pt will: be able to perform lifting activities without pain; in 8 weeks; progressing toward  Pt will: be able to walk upright without pain; in 8 weeks; progressing toward    Plan / Patient Education:     Continue with initial plan of care.  Progress with home program as tolerated.  Continue with S/L mobs if helpful.  Progress HEP as able.    Subjective:     Pain Rating: .5  low back, side 2/10  The patient reports that he gets a jabbing pain in his R side when he breathes deeply and with some other movements.  It comes and goes and for a while it went away, but then it came back.  The lower back has been doing pretty well.  When he gets out of the car, he has noticed that he is able to walk upright without difficulty or pain.    Objective:     Pain with lumbar extension, R rotation, and L side bending, mod/major movement loss with side bending and rotation.  Pain with L thoracic side bending and pulling with R rotation on the L.    Exercise #1: Cat/cow  Comment #1: HEP  Exercise #2: Ab sets/marching  Comment #2: LE extension x 10  Exercise #3: LTR  Comment #3: HEP  Exercise #4: KTC stretch  Comment #4: HEP  Exercise #5: Multifidus strengthening  Comment #5: L3 HEP  Exercise #6: Bridging  Comment #6: x 10    Appt time: 3:30PM - 3:57PM    Treatment Today     TREATMENT MINUTES COMMENTS   Evaluation     Self-care/ Home management     Manual therapy 12 R S/L L lumbar rotation mobs grades II-III   Neuromuscular Re-education     Therapeutic Activity     Therapeutic Exercises 15 NUSTEP x 5 minutes WL 5.0; subjective measures taken  See flowsheet   Gait training     Modality__________________                Total 27    Blank areas are intentional and mean the treatment did not include these items.       Juliet Almanza, PT, DPT  7/31/2018

## 2021-06-19 NOTE — PROGRESS NOTES
[facility] Daily Progress     Patient Name: Dhaval Veliz   Preferred Name: Alvarado  Date: 2018  Visit #: 5  PTA visit #:  NA  Referral Diagnosis: Lumbar spine pain  Referring provider: Adi Cano DO  Visit Diagnosis:     ICD-10-CM    1. Chronic midline low back pain without sciatica M54.5     G89.29    2. Muscle weakness (generalized) M62.81    3. Poor posture R29.3      Dhaval Veliz is a 78 y.o. male who presents to therapy today with chief complaints of chronic episodic low back pain. Onset date of sx was  after a severe muscle pull in his lower back.  Pt reported h/o scoliosis.  Pain symptoms are stabbing and twisting in nature when it comes.  Functional impairments include lifting heavy objects, woodworking, and quick movements.  Pt demo's signs and sx consistent with chronic LBP with acute pain episodes.     Precautions / Restrictions : None    Assessment:     HEP/POC compliance is  good .  The patient reports continued improvements in his pain level and function.  He is appropriate to continue x 1 session to finalize HEP/self-management.    Goal Status:  Pt. will be independent with home exercise program in : 6 weeks;Progressing toward  Pt will: be able to perform normal exercise without pain; in 8 weeks; progressing toward  Pt will: be able to perform lifting activities without pain; in 8 weeks; progressing toward  Pt will: be able to walk upright without pain; in 8 weeks; progressing toward    Plan / Patient Education:     Continue with initial plan of care.  Progress with home program as tolerated.  Progress HEP; work on exercises for [facility]term [facility] the [facility].  Plan to DC next session.    Subjective:     Pain Ratin  The patient reports that he is more straight when he gets up from a chair.  It is still not as good when getting out of a car.  There is no pain with standing up at home, but gets a momentary pain when he stops to straighten himself after driving for a  "long time.    Objective:     Decreased lumbar ROM all planes    Exercise #1: Cat/cow  Comment #1: HEP  Exercise #2: Ab sets/marching  Comment #2: LE extension HEP  Exercise #3: LTR  Comment #3: HEP  Exercise #4: KTC stretch  Comment #4: HEP  Exercise #5: Multifidus strengthening  Comment #5: L3 HEP  Exercise #6: Bridging  Comment #6: bridge march x 10  Exercise #7: Piriformis stretch  Comment #7: 30 seconds  Exercise #8: Hamstring stretch  Comment #8: 30 seconds  Exercise #9: Plank  Comment #9: On elbows 30\" x 2, side plank x 15\" bilaterally    Appt time: 10:30AM - 10:58AM    Treatment Today     TREATMENT MINUTES COMMENTS   Evaluation     Self-care/ Home management     Manual therapy     Neuromuscular Re-education     Therapeutic Activity     Therapeutic Exercises 28 NUSTEP x 5 minutes WL 6.0; subjective measures taken  See flowsheet   Gait training     Modality__________________                Total 28    Blank areas are intentional and mean the treatment did not include these items.       Juliet Almanza, PT, DPT  8/20/2018  "

## 2021-06-19 NOTE — LETTER
Letter by Tha Shields MD at      Author: Tha Shields MD Service: -- Author Type: --    Filed:  Encounter Date: 6/3/2019 Status: (Other)         Dhaval Veliz  [address]  [facility] [address]             Rosie 3, 2019         Dear Mr. Veliz,    Below are the results from your recent visit:    Resulted Orders   Echo Complete   Result Value Ref Range    LV volume diastolic 69 62 - 150 cm3    LV volume systolic 29 21 - 61 cm3    HR 73 bpm    IVSd 1.1 0.6 - 1.0 cm    LVIDd 3.7 (!) 4.2 - 5.8 cm    LVIDs 2.6 2.5 - 4.0 cm    LVOT diam 2.1 cm    LVOT mean gradient 2 mmHg    LVOT peak VTI 18.2 cm    LVOT mean jasper 70.1 cm/s    LV PWd 1.0 0.6 - 1.0 cm    MV E' lat jasper 7.6 cm/s    MV E' med jasper 8.19 cm/s    AV cusp sep 1.9 cm    AV cusp sep 1.9 cm    AV mean jasper 103 cm/s    AV mean gradient 4 mmHg    AV VTI 28.7 cm    AV peak jasper 126 cm/s    AO root 3.7 cm    LA size 3.7 cm    MV decel slope 2,940 mm/s2    MV decel time 264 ms    MV P 1/2 time 111 ms    MV peak A jasper 109 cm/s    MV peak E jasper 77.5 cm/s    MV mean jasper 71.5 cm/s    MV mean gradient 2 mmHg    MV VTI 43.5 cm    MV peak velocityoctiy 119 cm/s    TR peak jasper 254.0 cm/s    TAPSE 2.4 cm    LA area 2 16.7 cm2    LA area 1 14.3 cm2    LA length 4.5 cm    BSA 2.15 m2    Hieght 68.5 in    Weight 3,376 lbs    IVS/PW ratio 1.1     TR peak gradent 25.8 mmHg    LV FS 29.7 28 - 44 %    Echo LVEF calculated 58 55 - 75 %    LA volume 45.1 mL    LV mass 120.8 g    AV area 2.2 cm2    MV area p 1/2 time 2.0 cm2    MV area cont eq 1.4 cm2    MV E/A Ratio 0.7     LVOT area 3.46 cm2    LVOT SV 63.0 cm3    AV peak gradient 6.4 mmHg    MV peak gradient 5.7 mmHg    LV systolic volume index 13.5 11 - 31 cm3/m2    LV diastolic volume index 32.1 34 - 74 cm3/m2    LA volume index 21.0 mL/m2    LV mass index 56.2 g/m2    LV SVi 29.3 ml/m2    MV med E/e' ratio 9.5     MV lat E/e' ratio 10.2     LV CO 4.6 l/min    LV Ci 2.1 l/min/m2    Height 68.5 in     Weight 211 lbs    MV Avg E/e' Ratio 9.8 cm/s    AV DIM IND VTI 0.6     MVA VTI 1.45 cm2    Narrative      Normal left ventricular size and systolic function. The calculated left   ventricular ejection fraction is 58%.    Normal right ventricular size and systolic function.    No hemodynamically significant valvular heart abnormalities.    No previous study for comparison.          Hi Rich:  Your ECHO  Shows NO valvular heart disease.  No further follow up is required.    Please call with questions or contact us using [employer].    Sincerely,        Electronically signed by Tha Shields MD

## 2021-06-19 NOTE — PROGRESS NOTES
Assessment/Plan:      Diagnoses and all orders for this visit:    Lumbar spine pain  -     Ambulatory referral to Physical Therapy    Scoliosis  -     Ambulatory referral to Physical Therapy    Absent left patellar reflex  -     Ambulatory referral to Physical Therapy        Assessment: Very pleasant 77-year-old gentleman with a history of vertigo with:    1.  50 years intermittent low back pain multifactorial.  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  Back pain is likely mechanical in a combination of facet arthropathy, potential SI pain, myofascial pain.  2.  Loss of left patellar reflex with no current radicular symptoms.      Discussion:    1.  I discussed the diagnosis and treatment options.  He has multiple factors that could exacerbate mechanical pain in the lumbar spine with scoliosis and walking flex at the waist.  We discussed treatment options such as further imaging therapy and home exercises manipulation.  He has no pain today we will hold off on manipulation or imaging.  2.  Start physical therapy for core strengthening stabilization.  3.  He does have bifocals and given his flexed posture slightly at the waist I would recommend avoiding bifocals long-term to prevent falls.  4.  Follow-up with me as needed if symptoms worsen or flare.      It was our pleasure caring for your patient today, if there any questions or concerns please do not hesitate to contact us.      Subjective:   Patient ID: Dhaval Veliz is a 77 y.o. male.    History of Present Illness:Patient presents at the request of Dr. Shields for evaluation of low back pain.  He tells me he has had back pain for 50+ years.  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  Had some pain went to the emergency department who he tells me mentioned a slight compression but no surgery was done in his pain healed.   Since that time intermittent episodes of pain at the lumbosacral junction with any twisting.  Over the past 2-3 months has had increase in pain.  Wakes up with pain in the morning in the low back ended up sleeping in a recliner for a while and subsequently obtain a new bed and now sleeps at 0 gravity and his back pain is improved in the morning.  1 month ago he was also fanning some smoke out of the kitchen after burning toast and had a flare of the back pain again with twisting.  There is no radiation down the legs numbness tingling weakness and currently has no pain.  He manages pain with back brace and activity modifications.  Has not had any formal physical therapy or seen a chiropractor.  His son sees a chiropractor regularly for his low back issues.  Patient questions manipulation as an option.      Imaging: None available    Review of Systems: No leg paresthesias or weakness.  No bowel or bladder incontinence.  Some slowing of urination in general that he attributes to age.  Sleeping better with new mattress.  No rashes. Remainder of 12 point review systems negative unless listed above.    Past Medical History:   Diagnosis Date     Vertigo        The following portions of the patient's history were reviewed and updated as appropriate: allergies, current medications, past family history, past medical history, past social history, past surgical history and problem list.      WHO 5: 19    IDALIA Score: 8      Objective:   Physical Exam:    Vitals:    07/09/18 0957   BP: 143/65   Pulse: 77       General:  Well-appearing male in no acute distress.  Pleasant, cooperative, and interactive throughout the examination and interview.  CV: No lower extremity edema on inspection or paltation.  Distal pulses 2+ bilateral lower extremities.  Lymphatics: No cervical lymphadenopathy palpated. Eyes: sclera clear. Skin: No rashes or lesions seen over the head/neck, hairline, arms, legs,.  Respirations unlabored.  MSK: Gait is  slightly flexed at the waist, nonantalgic..  Able to heel-toe walk without difficulty.  Negative Romberg.  Spine: Right thoracic left lumbar scoliotic curve, mild, flex at the waist appears to have flattened lumbar lordotic curve.  Generalized decreased range of motion lumbar spine flexion and extension.  Can extend to neutral.  Palpation: No tenderness over the lumbar paraspinals or gluteal tissues.  Extremities: Full range of motion of the elbows, and wrists with no effusions or tenderness to palpation.   Full range of motion of the hips, knees, and ankles seated with no effusions or tenderness to palpation.   No hypermobility of the upper or lower extremities.  Neurologic exam: Mental status: Patient is alert and oriented with normal affect.  Attention, knowledge, memory, and language are intact.  Normal coordination throughout the examination.  Reflexes are 2+ and symmetric biceps, triceps, brachioradialis, 0 left, 2+ right patellar, and 2+ bilateral Achilles with down-going toes and Negative Catie's.  Sensation is intact to light touch throughout the upper and lower extremities bilaterally.  Manual muscle testing reveals 5 out of 5 in the hip flexors, knee flexors/extensors, ankle plantar flexors, ankle  dorsiflexors, and EHL.  Upper extremities: Grossly normal strength . Normal muscle bulk and tone in the arms and legs.    Negative seated   straight leg raise bilaterally.

## 2021-06-20 NOTE — PROGRESS NOTES
[facility] Daily Progress / Discharge Summary    Patient Name: Dhaval Veliz  Date: 2018  Visit #: 6  PTA visit #:  NA  Referral Diagnosis: Lumbar spine pain  Referring provider: Adi Cano  Visit Diagnosis:     ICD-10-CM    1. Chronic midline low back pain without sciatica M54.5     G89.29    2. Muscle weakness (generalized) M62.81    3. Poor posture R29.3          Assessment:   HEP/POC compliance is  good .  Patient has benefitted from skilled physical therapy and is making steady progress toward functional goals.    Goal Status:  Pt. will be independent with home exercise program in : 6 weeks;Met  Pt will: be able to perform normal exercise without pain; in 8 weeks; Met  Pt will: be able to perform lifting activities without pain; in 8 weeks; Met  Pt will: be able to walk upright without pain; in 8 weeks; Met    Plan / Patient Education:     Initial plan of care has been completed. Patient has responded appropriately to skilled PT intervention.  The patient met goals and has demonstrated understanding of/independence in the home program for self-care and progression to next steps.  No further therapy is required at this time.  The patient will initiate contact if questions or concerns arise.    Subjective:     Pain Ratin  The patient reports that he believes he is doing well and today should be his last day of PT.  He believes his posture is going to be a long term project.  There is no more aching in his lower back, but he does still get some sharper pains with twisting motions.  He has had that problem for years.    Patient Outcome Measures  Modified Oswestry Low Back Pain Disablity Questionnaire  in %: 4   Scores range from 0-100%, where a score of 0% represents minimal pain and maximal function. The minimal clinically important difference is a score reduction of 12%.      Objective:     Lumbar ROM is limited, but not painful.    Exercise #1: Cat/cow  Comment #1: HEP  Exercise  #2: Ab sets/marching  Comment #2: LE extension HEP  Exercise #3: LTR  Comment #3: HEP  Exercise #4: KTC stretch  Comment #4: HEP  Exercise #5: Multifidus strengthening  Comment #5: L3 HEP  Exercise #6: Bridging  Comment #6: bridge march Discussed  Exercise #7: Piriformis stretch  Comment #7: HEP  Exercise #8: Hamstring stretch  Comment #8: HEP  Exercise #9: Plank  Comment #9: forward and side HEP  Exercise #10: Foam roller  Comment #10: horizontal and vertical trial    Appt time: 10:30AM - 10:58AM    Treatment Today  TREATMENT MINUTES COMMENTS   Evaluation     Self-care/ Home management     Manual therapy     Neuromuscular Re-education     Therapeutic Activity     Therapeutic Exercises 28 NUSTEP x 5 minutes WL 5.0; subjective measures taken  See flowsheet  Discussed POC   Gait training     Modality__________________                Total 28    Blank areas are intentional and mean the treatment did not include these items.     Juliet Almanza, PT, DPT  9/4/2018

## 2021-06-25 NOTE — PROGRESS NOTES
DIAGNOSIS:  1. Acute pain of left knee  XR Knee Left 1 or 2 VWS       PLAN:     If you develop pain that limits your function or qulaity of life then follow up and an MRI could be ordered.            HPI:  Left knee hurts to pull it up.  About 6 week duration.  Hurts to left the knee while standing to put his socks on.   No pain with walking or weight bearing.     Also lack of surface feeling below the knee for 3-4 months.          Current Outpatient Medications on File Prior to Visit   Medication Sig Dispense Refill     albuterol (PROAIR HFA;PROVENTIL HFA;VENTOLIN HFA) 90 mcg/actuation inhaler Inhale 2 puffs every 4 (four) hours as needed for wheezing. 1 each 0     aspirin 81 MG EC tablet Take 1 tablet (81 mg total) by mouth every other day. 81 MG PO DAILY 100 tablet 3     No current facility-administered medications on file prior to visit.        Pmh: reviewed  Psh: reviewed  Allergy:  reviewed      EXAM:    /62 (Patient Site: Left Arm, Patient Position: Sitting, Cuff Size: Adult Large)   Pulse 62   Resp 16   Wt 221 lb 6.4 oz (100.4 kg)   BMI 32.93 kg/m    GEN:   ALERT, NAD, ORIENTED TIMES THREE  MS:  Full ROM.  No swelling, tx, redness.  Neg ant drawer and Ema.  EXT: WITHOUT EDEMA/SWELLING      Xray of the left knee shows narrowing of the medial joint space that is mild.  No results found for this or any previous visit (from the past 168 hour(s)).

## 2021-06-26 NOTE — PATIENT INSTRUCTIONS - HE
If you develop pain that limits your function or qulaity of life then follow up and an MRI could be ordered.

## 2021-06-27 ENCOUNTER — HEALTH MAINTENANCE LETTER (OUTPATIENT)
Age: 81
End: 2021-06-27

## 2021-07-06 VITALS
WEIGHT: 221.4 LBS | HEART RATE: 62 BPM | BODY MASS INDEX: 32.93 KG/M2 | SYSTOLIC BLOOD PRESSURE: 130 MMHG | RESPIRATION RATE: 16 BRPM | DIASTOLIC BLOOD PRESSURE: 62 MMHG

## 2021-10-17 ENCOUNTER — HEALTH MAINTENANCE LETTER (OUTPATIENT)
Age: 81
End: 2021-10-17

## 2022-07-23 ENCOUNTER — HEALTH MAINTENANCE LETTER (OUTPATIENT)
Age: 82
End: 2022-07-23

## 2022-10-01 ENCOUNTER — HEALTH MAINTENANCE LETTER (OUTPATIENT)
Age: 82
End: 2022-10-01

## 2023-08-12 ENCOUNTER — HEALTH MAINTENANCE LETTER (OUTPATIENT)
Age: 83
End: 2023-08-12

## 2024-09-29 ENCOUNTER — HEALTH MAINTENANCE LETTER (OUTPATIENT)
Age: 84
End: 2024-09-29